# Patient Record
Sex: MALE | URBAN - METROPOLITAN AREA
[De-identification: names, ages, dates, MRNs, and addresses within clinical notes are randomized per-mention and may not be internally consistent; named-entity substitution may affect disease eponyms.]

---

## 2020-07-04 ENCOUNTER — NURSE TRIAGE (OUTPATIENT)
Dept: OTHER | Facility: OTHER | Age: 20
End: 2020-07-04

## 2020-07-04 DIAGNOSIS — U07.1 COVID-19: Primary | ICD-10-CM

## 2020-07-05 NOTE — TELEPHONE ENCOUNTER
Reason for Disposition   COVID-19 Testing, questions about    Additional Information   Negative: [1] No COVID-19 EXPOSURE BUT [2] living with someone who was exposed and who has no symptoms of COVID-19    Answer Assessment - Initial Assessment Questions  1  CLOSE CONTACT: "Who is the person with the confirmed or suspected COVID-19 infection that you were exposed to?"      Patient's mother works at the hospital   2  PLACE of CONTACT: "Where were you when you were exposed to COVID-19?" (e g , home, school, medical waiting room; which city?)      Home   3  TYPE of CONTACT: "How much contact was there?" (e g , sitting next to, live in same house, work in same office, same building)        Same house   4  DURATION of CONTACT: "How long were you in contact with the COVID-19 patient?" (e g , a few seconds, passed by person, a few minutes, live with the patient)      Living together   6  TRAVEL: "Have you traveled out of the country recently?" If so, "When and where?"      * Also ask about out-of-state travel, since the CDC has identified some high-risk cities for community spread in the [address] [place] [address] [address]  * Note: Travel becomes less relevant if there is widespread community transmission where the patient lives  No  7  COMMUNITY SPREAD: "Are there lots of cases of COVID-19 (community spread) where you live?" (See public health department website, if unsure)        Isadora RODRÍGUEZ   8  SYMPTOMS: "Do you have any symptoms?" (e g , fever, cough, breathing difficulty)      No symptoms   10  HIGH RISK: "Do you have any heart or lung problems?  Do you have a weak immune system?" (e g , CHF, COPD, asthma, HIV positive, chemotherapy, renal failure, diabetes mellitus, sickle cell anemia)        No    Protocols used: CORONAVIRUS (COVID-19) EXPOSURE-ADULT-AH

## 2020-07-05 NOTE — TELEPHONE ENCOUNTER
Patient's father requested testing for Covid due to family is planning on traveling outside of the country to visit family  Patient's mother works at the hospital, father is concerned of safety for traveling

## 2020-07-05 NOTE — TELEPHONE ENCOUNTER
Regarding: Coronavirus (Family /)  ----- Message from Gamaliel Milian sent at 2020  7:10 PM EDT -----  Father calling on behalf of his family   He and the following PT's would like to be tested for COVID-19:    - Caleb Fleming ( 1969)  - Kristopher Rizzo ( 2000)  - Bethany Browne ( 2003)

## 2020-08-08 ENCOUNTER — TRANSCRIBE ORDERS (OUTPATIENT)
Dept: ADMINISTRATIVE | Facility: HOSPITAL | Age: 20
End: 2020-08-08

## 2020-08-08 ENCOUNTER — APPOINTMENT (OUTPATIENT)
Dept: LAB | Facility: HOSPITAL | Age: 20
End: 2020-08-08
Payer: COMMERCIAL

## 2020-08-08 DIAGNOSIS — E55.9 AVITAMINOSIS D: ICD-10-CM

## 2020-08-08 DIAGNOSIS — R94.5 NONSPECIFIC ABNORMAL RESULTS OF LIVER FUNCTION STUDY: Primary | ICD-10-CM

## 2020-08-08 DIAGNOSIS — R94.5 NONSPECIFIC ABNORMAL RESULTS OF LIVER FUNCTION STUDY: ICD-10-CM

## 2020-08-08 LAB
ALBUMIN SERPL BCP-MCNC: 4.2 G/DL (ref 3.5–5)
ALP SERPL-CCNC: 79 U/L (ref 46–116)
ALT SERPL W P-5'-P-CCNC: 42 U/L (ref 12–78)
AST SERPL W P-5'-P-CCNC: 45 U/L (ref 5–45)
BILIRUB DIRECT SERPL-MCNC: 0.15 MG/DL (ref 0–0.2)
BILIRUB SERPL-MCNC: 0.6 MG/DL (ref 0.2–1)
PROT SERPL-MCNC: 7.5 G/DL (ref 6.4–8.2)

## 2020-08-08 PROCEDURE — 80076 HEPATIC FUNCTION PANEL: CPT

## 2020-08-08 PROCEDURE — 82306 VITAMIN D 25 HYDROXY: CPT

## 2020-08-08 PROCEDURE — 36415 COLL VENOUS BLD VENIPUNCTURE: CPT

## 2020-08-09 LAB — 25(OH)D3 SERPL-MCNC: 50.4 NG/ML (ref 30–100)

## 2020-08-11 ENCOUNTER — TRANSCRIBE ORDERS (OUTPATIENT)
Dept: ADMINISTRATIVE | Facility: HOSPITAL | Age: 20
End: 2020-08-11

## 2020-08-11 DIAGNOSIS — R94.5 ABNORMAL FINDING ON LIVER FUNCTION: Primary | ICD-10-CM
